# Patient Record
Sex: FEMALE | Race: WHITE
[De-identification: names, ages, dates, MRNs, and addresses within clinical notes are randomized per-mention and may not be internally consistent; named-entity substitution may affect disease eponyms.]

---

## 2017-11-06 ENCOUNTER — HOSPITAL ENCOUNTER (OUTPATIENT)
Dept: HOSPITAL 47 - RADMAMWWP | Age: 72
Discharge: HOME | End: 2017-11-06
Payer: MEDICARE

## 2017-11-06 DIAGNOSIS — Z12.31: Primary | ICD-10-CM

## 2017-11-06 PROCEDURE — 77063 BREAST TOMOSYNTHESIS BI: CPT

## 2017-11-08 NOTE — MM
Reason for exam: screening  (asymptomatic).

Last mammogram was performed 1 year and 1 month ago.



History:

Patient is postmenopausal and is nulliparous.

Reductions of both breasts, 1989.  Reductions of both breasts, 1983.  Excisional 

biopsy of the left breast.



Physical Findings:

A clinical breast exam by your physician is recommended on an annual basis and 

results should be correlated with mammographic findings.



MG 3D Screening Mammo W/Cad

Bilateral CC and MLO view(s) were taken.

Prior study comparison: October 7, 2016, bilateral MG 3d screening mammo w/cad.  

September 21, 2015, bilateral MG screening mammo w CAD.

The breast tissue is heterogeneously dense. This may lower the sensitivity of 

mammography.

Finding: There are typically benign fine diffuse/scattered calcifications in both 

breasts.

No significant changes in finding since October 7, 2016 and September 21, 2015.





ASSESSMENT: Benign, BI-RAD 2



RECOMMENDATION:

Routine screening mammogram of both breasts in 1 year.

## 2019-01-24 ENCOUNTER — HOSPITAL ENCOUNTER (OUTPATIENT)
Dept: HOSPITAL 47 - RADMAMWWP | Age: 74
Discharge: HOME | End: 2019-01-24
Attending: INTERNAL MEDICINE
Payer: MEDICARE

## 2019-01-24 DIAGNOSIS — Z12.31: Primary | ICD-10-CM

## 2019-01-24 PROCEDURE — 77063 BREAST TOMOSYNTHESIS BI: CPT

## 2019-01-24 PROCEDURE — 77067 SCR MAMMO BI INCL CAD: CPT

## 2019-01-27 NOTE — MM
Reason for exam: screening  (asymptomatic).

Last mammogram was performed 1 year and 3 months ago.



History:

Patient is postmenopausal and is nulliparous.

Reductions of both breasts, 1989.  Reductions of both breasts, 1983.  Excisional 

biopsy of the left breast.



MG 3D Screening Mammo W/Cad

Bilateral CC, MLO, and XCCL view(s) were taken.

Prior study comparison: November 6, 2017, bilateral MG 3d screening mammo w/cad.  

October 7, 2016, bilateral MG 3d screening mammo w/cad.

The breast tissue is heterogeneously dense. This may lower the sensitivity of 

mammography.

No discrete abnormality.  No significant changes when compared with prior studies.





ASSESSMENT: Benign, BI-RAD 2



RECOMMENDATION:

Routine screening mammogram of both breasts in 1 year.

## 2019-11-25 ENCOUNTER — HOSPITAL ENCOUNTER (OUTPATIENT)
Dept: HOSPITAL 47 - OR | Age: 74
Discharge: HOME | End: 2019-11-25
Attending: OPHTHALMOLOGY
Payer: MEDICARE

## 2019-11-25 VITALS — SYSTOLIC BLOOD PRESSURE: 137 MMHG | DIASTOLIC BLOOD PRESSURE: 62 MMHG | HEART RATE: 69 BPM

## 2019-11-25 VITALS — TEMPERATURE: 98 F

## 2019-11-25 VITALS — RESPIRATION RATE: 17 BRPM

## 2019-11-25 VITALS — BODY MASS INDEX: 27.3 KG/M2

## 2019-11-25 DIAGNOSIS — H00.026: ICD-10-CM

## 2019-11-25 DIAGNOSIS — Z90.89: ICD-10-CM

## 2019-11-25 DIAGNOSIS — H52.4: ICD-10-CM

## 2019-11-25 DIAGNOSIS — Z90.710: ICD-10-CM

## 2019-11-25 DIAGNOSIS — H54.62: ICD-10-CM

## 2019-11-25 DIAGNOSIS — H00.023: ICD-10-CM

## 2019-11-25 DIAGNOSIS — H25.813: Primary | ICD-10-CM

## 2019-11-25 DIAGNOSIS — Z97.3: ICD-10-CM

## 2019-11-25 DIAGNOSIS — Z79.899: ICD-10-CM

## 2019-11-25 DIAGNOSIS — Z88.0: ICD-10-CM

## 2019-11-25 DIAGNOSIS — J30.2: ICD-10-CM

## 2019-11-25 DIAGNOSIS — Z88.5: ICD-10-CM

## 2019-11-25 DIAGNOSIS — Z88.6: ICD-10-CM

## 2019-11-25 DIAGNOSIS — E78.2: ICD-10-CM

## 2019-11-25 DIAGNOSIS — H04.123: ICD-10-CM

## 2019-11-25 DIAGNOSIS — I10: ICD-10-CM

## 2019-11-25 DIAGNOSIS — Z98.890: ICD-10-CM

## 2019-11-25 DIAGNOSIS — M19.90: ICD-10-CM

## 2019-11-25 DIAGNOSIS — Z83.3: ICD-10-CM

## 2019-11-25 DIAGNOSIS — H53.032: ICD-10-CM

## 2019-11-25 PROCEDURE — 66984 XCAPSL CTRC RMVL W/O ECP: CPT

## 2019-11-25 RX ADMIN — CYCLOPENTOLATE HYDROCHLORIDE ONE DROPS: 10 SOLUTION/ DROPS OPHTHALMIC at 13:19

## 2019-11-25 RX ADMIN — CYCLOPENTOLATE HYDROCHLORIDE ONE DROPS: 10 SOLUTION/ DROPS OPHTHALMIC at 13:10

## 2019-11-25 RX ADMIN — PHENYLEPHRINE HYDROCHLORIDE NR DROPS: 25 SOLUTION/ DROPS OPHTHALMIC at 13:24

## 2019-11-25 RX ADMIN — PHENYLEPHRINE HYDROCHLORIDE NR DROPS: 25 SOLUTION/ DROPS OPHTHALMIC at 13:16

## 2019-11-25 RX ADMIN — CYCLOPENTOLATE HYDROCHLORIDE ONE DROPS: 10 SOLUTION/ DROPS OPHTHALMIC at 13:27

## 2019-11-25 RX ADMIN — PHENYLEPHRINE HYDROCHLORIDE NR DROPS: 25 SOLUTION/ DROPS OPHTHALMIC at 13:30

## 2020-02-13 ENCOUNTER — HOSPITAL ENCOUNTER (OUTPATIENT)
Dept: HOSPITAL 47 - RADMAMWWP | Age: 75
End: 2020-02-13
Attending: INTERNAL MEDICINE
Payer: MEDICARE

## 2020-02-13 DIAGNOSIS — Z12.31: Primary | ICD-10-CM

## 2020-02-13 PROCEDURE — 77063 BREAST TOMOSYNTHESIS BI: CPT

## 2020-02-13 PROCEDURE — 77067 SCR MAMMO BI INCL CAD: CPT

## 2020-02-14 NOTE — MM
Reason for exam: screening  (asymptomatic).

Last mammogram was performed 1 year and 1 month ago.



History:

Patient is postmenopausal and is nulliparous.

Reductions of both breasts, 1989.  Reductions of both breasts, 1983.  Excisional 

biopsy of the left breast.



Physical Findings:

A clinical breast exam by your physician is recommended on an annual basis and 

results should be correlated with mammographic findings.



MG 3D Screening Mammo W/Cad

Bilateral CC and MLO view(s) were taken.  XCCM view(s) were taken of the right 

breast.  XCCL view(s) were taken of the left breast.

Prior study comparison: January 24, 2019, bilateral MG 3d screening mammo w/cad.  

November 6, 2017, bilateral MG 3d screening mammo w/cad.

The breast tissue is heterogeneously dense. This may lower the sensitivity of 

mammography.  Benign appearing bilateral calcifications. Post surgical change 

bilaterally. Left lower inner quadrant architectural distortion appears more 

prominent. Ultrasound recommended to ensure a vascular scar.





ASSESSMENT: Incomplete: need additional imaging evaluation, BI-RAD 0



RECOMMENDATION:

Ultrasound of the left breast.

(lower inner quadrant)



[facility] will attempt to contact patient  to return for ultrasound.

## 2020-02-27 ENCOUNTER — HOSPITAL ENCOUNTER (OUTPATIENT)
Dept: HOSPITAL 47 - RADUSWWP | Age: 75
Discharge: HOME | End: 2020-02-27
Attending: INTERNAL MEDICINE
Payer: MEDICARE

## 2020-02-27 DIAGNOSIS — R92.8: Primary | ICD-10-CM

## 2020-02-28 NOTE — USB
Reason for exam: additional evaluation requested from abnormal screening.



History:

Patient is postmenopausal and is nulliparous.

Reductions of both breasts, 1989.  Reductions of both breasts, 1983.  Excisional 

biopsy of the left breast.



Physical Findings:

Nurse did not find any significant physical abnormalities on exam.



US Breast Workup Limited LT

Left limited breast ultrasound including focal area of concern, retroareolar and 

axilla demonstrates a 4 x 2 x 2mm hypoechoic lesion at 8 o'clock.



These results were verbally communicated with the patient and result sheet given 

to the patient on 2/27/20.





ASSESSMENT: Suspicious, BI-RAD 4



RECOMMENDATION:

Ultrasound core biopsy of the left breast.

(correlate with mammographic finding after clip placement)



Called Dr. Flores's office with mammographic findings and has scheduled an 

appointment for the patient for 4/11/20 at 3:00 with Dr. Dang.

Biopsy scheduled for 3/25/20.



PRELIMINARY REPORT CALLED AND FAXED TO DR. DANG ON 2/28/20.

## 2021-02-03 NOTE — OP
OPERATIVE REPORT



DATE OF SURGERY:

11/25/2019



SURGEON:

Dr. Giuseppe Peacock



PREOPERATIVE DIAGNOSES:

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



POSTOPERATIVE DIAGNOSES:

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



OPERATION:

Phacoemulsification of cataract and intraocular lens implant of the right eye.



ESTIMATED BLOOD LOSS:

Zero.



SPECIMEN TAKEN:

None.



NARRATIVE:

After obtaining the appropriate consent, the patient was brought to the Operating Room

where the patient was placed under cardiac monitoring and prepped and draped in the

usual sterile manner.  At the 11 o'clock position a 15 degree super sharp blade was

used to create a paracentesis followed by instillation of 1% Xylocaine MPF 50:50 mix

with BSS into the anterior chamber.  This was followed by Amvisc to stabilize the

anterior chamber.  At the  9 o'clock position a self-sealing corneal flap incision was

created using 2.8 mm sarah keratome.  A cystotome was used to initiate a continuous

tear capsulorrhexis which was completed with the Utrata forceps.  A Binkhorst cannula

was used to hydrodissect the lens nucleus followed by hydrodelineation.

Phacoemulsification of the lens was performed utilizing phaco chop in 13.68 seconds at

13% power.  The remaining cortical material was removed using the irrigation aspiration

mode followed by additional 1% Xylocaine MPF into the anterior chamber followed by

viscoelastic to stabilize the capsular bag.  An ALEXSANDER PCB00, 20.5 diopters posterior

chamber lens was placed into the capsular bag without difficulty.  The remaining

viscoelastic material was removed from the anterior chamber with the

irrigation/aspiration.  Balanced salt solution was used to normalize the intraocular

pressure.  The incision was checked for watertight integrity.  The patient then

received two drops of 0.5% timolol followed by two drops Vigamox, was lightly patched

and shielded in the usual manner.  There were no complications from the procedure.  The

patient tolerated the procedure well and was returned to recovery in good condition.





MMODL / IJN: [id number] / Job#: [id number] Const: AOX3 nontoxic appearing, no apparent physical distress. Stable gait   HEENT: NC/AT. Moist mucous membranes.  Neck: Soft and supple. Full ROM without pain.  Resp: Speaking in full sentences. No evidence of respiratory distress  Skin: No rashes, abrasions or lacerations.  Neuro: Awake, alert & oriented x 3. Moves all extremities symmetrically.

## 2023-07-30 ENCOUNTER — HOSPITAL ENCOUNTER (EMERGENCY)
Dept: HOSPITAL 47 - EC | Age: 78
Discharge: HOME | End: 2023-07-30
Payer: MEDICARE

## 2023-07-30 VITALS — DIASTOLIC BLOOD PRESSURE: 68 MMHG | SYSTOLIC BLOOD PRESSURE: 161 MMHG | RESPIRATION RATE: 18 BRPM | HEART RATE: 86 BPM

## 2023-07-30 VITALS — TEMPERATURE: 98 F

## 2023-07-30 DIAGNOSIS — E86.0: ICD-10-CM

## 2023-07-30 DIAGNOSIS — Z88.0: ICD-10-CM

## 2023-07-30 DIAGNOSIS — R53.1: Primary | ICD-10-CM

## 2023-07-30 DIAGNOSIS — Z88.6: ICD-10-CM

## 2023-07-30 DIAGNOSIS — Z88.8: ICD-10-CM

## 2023-07-30 DIAGNOSIS — I10: ICD-10-CM

## 2023-07-30 DIAGNOSIS — Z79.899: ICD-10-CM

## 2023-07-30 LAB
ALBUMIN SERPL-MCNC: 3.9 G/DL (ref 3.5–5)
ALP SERPL-CCNC: 122 U/L (ref 38–126)
ALT SERPL-CCNC: 22 U/L (ref 4–34)
ANION GAP SERPL CALC-SCNC: 6 MMOL/L
AST SERPL-CCNC: 26 U/L (ref 14–36)
BASOPHILS # BLD AUTO: 0 K/UL (ref 0–0.2)
BASOPHILS NFR BLD AUTO: 0 %
BUN SERPL-SCNC: 12 MG/DL (ref 7–17)
CALCIUM SPEC-MCNC: 8.8 MG/DL (ref 8.4–10.2)
CHLORIDE SERPL-SCNC: 98 MMOL/L (ref 98–107)
CO2 SERPL-SCNC: 25 MMOL/L (ref 22–30)
EOSINOPHIL # BLD AUTO: 0.1 K/UL (ref 0–0.7)
EOSINOPHIL NFR BLD AUTO: 1 %
ERYTHROCYTE [DISTWIDTH] IN BLOOD BY AUTOMATED COUNT: 4.64 M/UL (ref 3.8–5.4)
ERYTHROCYTE [DISTWIDTH] IN BLOOD: 12.8 % (ref 11.5–15.5)
GLUCOSE SERPL-MCNC: 134 MG/DL (ref 74–99)
HCT VFR BLD AUTO: 41.8 % (ref 34–46)
HGB BLD-MCNC: 14.3 GM/DL (ref 11.4–16)
LYMPHOCYTES # SPEC AUTO: 1.9 K/UL (ref 1–4.8)
LYMPHOCYTES NFR SPEC AUTO: 29 %
MAGNESIUM SPEC-SCNC: 1.9 MG/DL (ref 1.6–2.3)
MCH RBC QN AUTO: 30.7 PG (ref 25–35)
MCHC RBC AUTO-ENTMCNC: 34.1 G/DL (ref 31–37)
MCV RBC AUTO: 90.1 FL (ref 80–100)
MONOCYTES # BLD AUTO: 0.4 K/UL (ref 0–1)
MONOCYTES NFR BLD AUTO: 6 %
NEUTROPHILS # BLD AUTO: 4.1 K/UL (ref 1.3–7.7)
NEUTROPHILS NFR BLD AUTO: 63 %
NT-PROBNP SERPL-MCNC: 600 PG/ML
PH UR: 5.5 [PH] (ref 5–8)
PLATELET # BLD AUTO: 185 K/UL (ref 150–450)
POTASSIUM SERPL-SCNC: 4.1 MMOL/L (ref 3.5–5.1)
PROT SERPL-MCNC: 6.7 G/DL (ref 6.3–8.2)
SODIUM SERPL-SCNC: 129 MMOL/L (ref 137–145)
SP GR UR: 1.01 (ref 1–1.03)
SQUAMOUS UR QL AUTO: 2 /HPF (ref 0–4)
UROBILINOGEN UR QL STRIP: <2 MG/DL (ref ?–2)
WBC # BLD AUTO: 6.5 K/UL (ref 3.8–10.6)
WBC #/AREA URNS HPF: 5 /HPF (ref 0–5)

## 2023-07-30 PROCEDURE — 71046 X-RAY EXAM CHEST 2 VIEWS: CPT

## 2023-07-30 PROCEDURE — 81001 URINALYSIS AUTO W/SCOPE: CPT

## 2023-07-30 PROCEDURE — 85025 COMPLETE CBC W/AUTO DIFF WBC: CPT

## 2023-07-30 PROCEDURE — 36415 COLL VENOUS BLD VENIPUNCTURE: CPT

## 2023-07-30 PROCEDURE — 84484 ASSAY OF TROPONIN QUANT: CPT

## 2023-07-30 PROCEDURE — 83735 ASSAY OF MAGNESIUM: CPT

## 2023-07-30 PROCEDURE — 99285 EMERGENCY DEPT VISIT HI MDM: CPT

## 2023-07-30 PROCEDURE — 83880 ASSAY OF NATRIURETIC PEPTIDE: CPT

## 2023-07-30 PROCEDURE — 80053 COMPREHEN METABOLIC PANEL: CPT

## 2023-07-30 PROCEDURE — 83605 ASSAY OF LACTIC ACID: CPT

## 2023-07-30 PROCEDURE — 93005 ELECTROCARDIOGRAM TRACING: CPT

## 2023-07-30 PROCEDURE — 96360 HYDRATION IV INFUSION INIT: CPT

## 2023-07-30 NOTE — XR
EXAMINATION TYPE: XR chest 2V

 

DATE OF EXAM: 7/30/2023 8:50 AM

 

COMPARISON: Chest radiographs from 2/21/2015

 

TECHNIQUE: XR chest 2V Frontal and lateral views of the chest.

 

CLINICAL INDICATION:Female, 77 years old with history of Weakness; 

 

FINDINGS: 

Lungs/Pleura: There is no evidence of pleural effusion, focal consolidation, or pneumothorax.  

Pulmonary vascularity: Unremarkable.

Heart/mediastinum: Cardiomediastinal silhouette is unremarkable.

Musculoskeletal: No acute osseous pathology.

 

IMPRESSION: 

No acute cardiopulmonary disease/process.

## 2023-08-10 ENCOUNTER — HOSPITAL ENCOUNTER (INPATIENT)
Dept: HOSPITAL 47 - EC | Age: 78
LOS: 5 days | Discharge: NURSING, REHAB OR LONG-TERM CARE | DRG: 640 | End: 2023-08-15
Attending: INTERNAL MEDICINE | Admitting: INTERNAL MEDICINE
Payer: MEDICARE

## 2023-08-10 DIAGNOSIS — Z90.710: ICD-10-CM

## 2023-08-10 DIAGNOSIS — E86.1: ICD-10-CM

## 2023-08-10 DIAGNOSIS — I80.8: ICD-10-CM

## 2023-08-10 DIAGNOSIS — L73.9: ICD-10-CM

## 2023-08-10 DIAGNOSIS — K21.9: ICD-10-CM

## 2023-08-10 DIAGNOSIS — Z98.41: ICD-10-CM

## 2023-08-10 DIAGNOSIS — E11.65: ICD-10-CM

## 2023-08-10 DIAGNOSIS — E78.5: ICD-10-CM

## 2023-08-10 DIAGNOSIS — E87.1: Primary | ICD-10-CM

## 2023-08-10 DIAGNOSIS — Z86.72: ICD-10-CM

## 2023-08-10 DIAGNOSIS — E86.0: ICD-10-CM

## 2023-08-10 DIAGNOSIS — Z88.6: ICD-10-CM

## 2023-08-10 DIAGNOSIS — N18.31: ICD-10-CM

## 2023-08-10 DIAGNOSIS — Z88.0: ICD-10-CM

## 2023-08-10 DIAGNOSIS — N17.0: ICD-10-CM

## 2023-08-10 DIAGNOSIS — K56.609: ICD-10-CM

## 2023-08-10 DIAGNOSIS — K52.9: ICD-10-CM

## 2023-08-10 DIAGNOSIS — L03.113: ICD-10-CM

## 2023-08-10 DIAGNOSIS — E11.22: ICD-10-CM

## 2023-08-10 DIAGNOSIS — Z79.899: ICD-10-CM

## 2023-08-10 DIAGNOSIS — I12.9: ICD-10-CM

## 2023-08-10 DIAGNOSIS — R11.2: ICD-10-CM

## 2023-08-10 LAB
ALBUMIN SERPL-MCNC: 4.1 G/DL (ref 3.5–5)
ALP SERPL-CCNC: 136 U/L (ref 38–126)
ALT SERPL-CCNC: 26 U/L (ref 4–34)
ANION GAP SERPL CALC-SCNC: 10 MMOL/L
AST SERPL-CCNC: 26 U/L (ref 14–36)
BASOPHILS # BLD AUTO: 0 K/UL (ref 0–0.2)
BASOPHILS NFR BLD AUTO: 1 %
BUN SERPL-SCNC: 15 MG/DL (ref 7–17)
CALCIUM SPEC-MCNC: 9.1 MG/DL (ref 8.4–10.2)
CHLORIDE SERPL-SCNC: 94 MMOL/L (ref 98–107)
CO2 SERPL-SCNC: 22 MMOL/L (ref 22–30)
EOSINOPHIL # BLD AUTO: 0.1 K/UL (ref 0–0.7)
EOSINOPHIL NFR BLD AUTO: 1 %
ERYTHROCYTE [DISTWIDTH] IN BLOOD BY AUTOMATED COUNT: 4.75 M/UL (ref 3.8–5.4)
ERYTHROCYTE [DISTWIDTH] IN BLOOD: 12.7 % (ref 11.5–15.5)
GLUCOSE SERPL-MCNC: 155 MG/DL (ref 74–99)
HCT VFR BLD AUTO: 42 % (ref 34–46)
HGB BLD-MCNC: 14.6 GM/DL (ref 11.4–16)
LYMPHOCYTES # SPEC AUTO: 2 K/UL (ref 1–4.8)
LYMPHOCYTES NFR SPEC AUTO: 33 %
MAGNESIUM SPEC-SCNC: 1.8 MG/DL (ref 1.6–2.3)
MCH RBC QN AUTO: 30.7 PG (ref 25–35)
MCHC RBC AUTO-ENTMCNC: 34.7 G/DL (ref 31–37)
MCV RBC AUTO: 88.5 FL (ref 80–100)
MONOCYTES # BLD AUTO: 0.4 K/UL (ref 0–1)
MONOCYTES NFR BLD AUTO: 6 %
NEUTROPHILS # BLD AUTO: 3.6 K/UL (ref 1.3–7.7)
NEUTROPHILS NFR BLD AUTO: 59 %
PH UR: 6 [PH] (ref 5–8)
PLATELET # BLD AUTO: 180 K/UL (ref 150–450)
POTASSIUM SERPL-SCNC: 4.2 MMOL/L (ref 3.5–5.1)
PROT SERPL-MCNC: 6.7 G/DL (ref 6.3–8.2)
SODIUM SERPL-SCNC: 126 MMOL/L (ref 137–145)
SP GR UR: 1 (ref 1–1.03)
UROBILINOGEN UR QL STRIP: <2 MG/DL (ref ?–2)
WBC # BLD AUTO: 6.2 K/UL (ref 3.8–10.6)

## 2023-08-10 PROCEDURE — 80053 COMPREHEN METABOLIC PANEL: CPT

## 2023-08-10 PROCEDURE — 85025 COMPLETE CBC W/AUTO DIFF WBC: CPT

## 2023-08-10 PROCEDURE — 84443 ASSAY THYROID STIM HORMONE: CPT

## 2023-08-10 PROCEDURE — 96374 THER/PROPH/DIAG INJ IV PUSH: CPT

## 2023-08-10 PROCEDURE — 84300 ASSAY OF URINE SODIUM: CPT

## 2023-08-10 PROCEDURE — 81003 URINALYSIS AUTO W/O SCOPE: CPT

## 2023-08-10 PROCEDURE — 83935 ASSAY OF URINE OSMOLALITY: CPT

## 2023-08-10 PROCEDURE — 80048 BASIC METABOLIC PNL TOTAL CA: CPT

## 2023-08-10 PROCEDURE — 99285 EMERGENCY DEPT VISIT HI MDM: CPT

## 2023-08-10 PROCEDURE — 36415 COLL VENOUS BLD VENIPUNCTURE: CPT

## 2023-08-10 PROCEDURE — 83735 ASSAY OF MAGNESIUM: CPT

## 2023-08-10 PROCEDURE — 96361 HYDRATE IV INFUSION ADD-ON: CPT

## 2023-08-10 PROCEDURE — 71045 X-RAY EXAM CHEST 1 VIEW: CPT

## 2023-08-10 PROCEDURE — 83036 HEMOGLOBIN GLYCOSYLATED A1C: CPT

## 2023-08-10 RX ADMIN — ATORVASTATIN CALCIUM SCH MG: 10 TABLET, FILM COATED ORAL at 20:55

## 2023-08-10 RX ADMIN — METOPROLOL TARTRATE SCH MG: 25 TABLET, FILM COATED ORAL at 20:55

## 2023-08-10 NOTE — HP
HISTORY AND PHYSICAL



CHIEF COMPLAINT:

Weakness.



HISTORY OF PRESENT ILLNESS:

This is a 77-year-old woman with a past medical history of multiple medical issues

including hypertension, hyperlipidemia, was admitted with some weakness and brain fog.

The patient was apparently drinking a little too much water according to her, but the

patient also had bedbug infestation and heat was supplied into the room according to

her, and currently, the patient is probably dehydrated and admitted for further

evaluation and treatment.  There is no history of any fever, rigors, or chills at this

time.



PAST MEDICAL HISTORY:

Reviewed include hypertension, hyperlipidemia. Rest of the history and rest of the

chart is also reviewed.



HOME MEDICATIONS:

Zestril. Dose and rest of medications reviewed.



ALLERGIES:

Reviewed include Motrin, rest of allergies noted.



FAMILY HISTORY:

Diabetes mellitus in the family.



SOCIAL HISTORY:

No history of smoking.  Occasional alcohol.



REVIEW OF SYSTEMS:

Fourteen-point review is negative except as mentioned earlier.



PHYSICAL EXAMINATION:

VITAL SIGNS: Pulse is 82, blood pressure 113/88, respirations 18.

HEENT: Conjunctivae normal. Oral mucosa dry.

NECK: No JVD.

CARDIOVASCULAR: S1, S2 normal.

RESPIRATIONS:  Clear to auscultation.

ABDOMEN:  Soft.

NERVOUS SYSTEM: Nonfocal.

SKIN:  No ulcer, rash, or bleeding.

JOINTS:  No active deforming arthropathy.



LABORATORY DATA:

Sodium 120.



ASSESSMENT:

1. Hyponatremia, possibly hypovolemic.

2. Hypertension.

3. Hyperlipidemia.

4. Multiple medical issues.



RECOMMENDATIONS AND DISCUSSION:

This is a 77-year-old woman, who presented with multiple complex medical issues, we

will continue with IV fluids and hydration. Otherwise, resume the home medications and

Nephrology consultation.  Repeat labs in the morning.  to evaluate the

home situation. Otherwise, prognosis guarded. Further recommendations to follow.  See

orders for further details.





MMODL / IJN: [id number] / Job#: [id number]

## 2023-08-10 NOTE — ED
General Adult HPI





- General


Chief complaint: Weakness


Stated complaint: Dehydration, Head Fog


Time Seen by Provider: 08/10/23 06:14


Source: patient, RN notes reviewed


Mode of arrival: wheelchair


Limitations: no limitations





- History of Present Illness


Initial comments: 


77-year-old female presents emergency Department with chief complaint of feeling

thirsty, episode of nausea and vomiting.  Patient states that she vomited 

Tuesday she states that she just felt off yesterday felt thirsty again today.  

She was seen here week ago for similar complaints.  She states that she feels 

that she can't drink enough water.  She feels that she may be dehydrated.  She 

denies any chest pain shortness of breath.  She did have an episode where she 

felt lightheaded today but has resolved now with chest pain or shortness breath.

 Denies any dysuria hematuria.








- Related Data


                                Home Medications











 Medication  Instructions  Recorded  Confirmed


 


amLODIPine/ATORVASTATIN [Caduet 10 1 tab PO HS 02/21/15 03/10/20





mg-10 mg Tablet]   


 


Calcium Carbonate/Vitamin D3 1 each PO DAILY 02/22/15 03/10/20





[Calcium 600-Vit D3 400 Tablet]   


 


Metoprolol Tartrate 25 mg PO BID 03/10/20 03/10/20


 


lisinopriL [Zestril] 5 mg PO DAILY 03/10/20 03/10/20











                                    Allergies











Allergy/AdvReac Type Severity Reaction Status Date / Time


 


acetaminophen [From Tylenol] AdvReac  Vomiting Verified 03/10/20 11:54


 


ibuprofen [From Motrin] AdvReac  Vomiting Verified 03/10/20 11:54


 


Penicillins AdvReac  Rapid Verified 03/10/20 11:54





   Heart Rate  


 


propoxyphene HCl AdvReac  Rapid Verified 03/10/20 11:54





[From Darvon]   Heart Rate  














Review of Systems


ROS Statement: 


Those systems with pertinent positive or pertinent negative responses have been 

documented in the HPI.





ROS Other: All systems not noted in ROS Statement are negative.





Past Medical History


Past Medical History: Hyperlipidemia, Hypertension


History of Any Multi-Drug Resistant Organisms: None Reported


Past Surgical History: Breast Surgery, Hysterectomy, Tonsillectomy


Additional Past Surgical History / Comment(s): 2 clamps in left eye, chin lift, 

upper and lower eyelids bilateral taken off, breast reduction, right cataract 

removed


Past Anesthesia/Blood Transfusion Reactions: No Reported Reaction


Past Psychological History: No Psychological Hx Reported


Smoking Status: Never smoker


Past Alcohol Use History: Occasional


Past Drug Use History: None Reported





- Past Family History


  ** Brother(s)


Family Medical History: Diabetes Mellitus





General Exam


Limitations: no limitations


General appearance: alert, in no apparent distress


Head exam: Present: atraumatic, normocephalic, normal inspection


Eye exam: Present: normal appearance, PERRL, EOMI.  Absent: scleral icterus, 

conjunctival injection, periorbital swelling


ENT exam: Present: normal exam, normal oropharynx, mucous membranes moist


Neck exam: Present: normal inspection, full ROM.  Absent: tenderness, 

meningismus, lymphadenopathy


Respiratory exam: Present: normal lung sounds bilaterally.  Absent: respiratory 

distress, wheezes, rales, rhonchi, stridor


Cardiovascular Exam: Present: regular rate, normal rhythm, normal heart sounds. 

Absent: systolic murmur, diastolic murmur, rubs, gallop, clicks


GI/Abdominal exam: Present: soft, normal bowel sounds.  Absent: distended, 

tenderness, guarding, rebound, rigid


Neurological exam: Present: alert


Skin exam: Present: warm, dry, intact, normal color.  Absent: rash





Course


                                   Vital Signs











  08/10/23 08/10/23





  06:09 07:50


 


Temperature 98 F 97.8 F


 


Pulse Rate 92 69


 


Respiratory 18 18





Rate  


 


Blood Pressure 117/71 144/81


 


O2 Sat by Pulse 98 95





Oximetry  














Medical Decision Making





- Medical Decision Making


Was pt. sent in by a medical professional or institution ([facility], NP, urgent 

care, hospital, or nursing home...) When possible be specific


@  -No


Did you speak to anyone other than the patient for history (EMS, parent, family,

police, friend...)? What history was obtained from this source 


@  -No


Did you review nursing and triage notes (agree or disagree)?  Why? 


@  -I reviewed and agree with nursing and triage notes


Were old charts reviewed (outside hosp., previous admission, EMS record, old 

EKG, old radiological studies, urgent care reports/EKG's, nursing home records)?

Report findings 


@  -Reviewed recent laboratory studies


Differential Diagnosis (chest pain, altered mental status, abdominal pain women,

abdominal pain men, vaginal bleeding, weakness, fever, dyspnea, syncope, 

headache, dizziness, GI bleed, back pain, seizure, CVA, palpatations, mental 

health, musculoskeletal)? 


@  -Differential Weakness:


Hypoglycemia, shock, sepsis, hyponatremia, anemia, infection, MI, ETOH, adverse 

medicine reaction, overdose, stroke, this is not meant to be an all-inclusive 

list.


EKG interpreted by me (3pts min.).


@  -[None 


X-rays interpreted by me (1pt min.).


@  -None done


CT interpreted by me (1pt min.).


@  -None done


U/S interpreted by me (1pt. min.).


@  -None done


What testing was considered but not performed or refused? (CT, X-rays, U/S, 

labs)? Why?


@  -None


What meds were considered but not given or refused? Why?


@  -None


Did you discuss the management of the patient with other professionals 

(professionals i.e. , PA, NP, lab, RT, psych nurse, , , 

teacher, , )? Give summary


@  -No


Was smoking cessation discussed for >3mins.?


@  -No


Was critical care preformed (if so, how long)?


@  -No


Were there social determinants of health that impacted care today? How? 

(Homelessness, low income, unemployed, alcoholism, drug addiction, 

transportation, low edu. Level, literacy, decrease access to med. care, detention, re

hab)?


@  -No


Was there de-escalation of care discussed even if they declined (Discuss DNR or 

withdrawal of care, Hospice)? DNR status


@  -No


What co-morbidities impacted this encounter? (DM, HTN, Smoking, COPD, CAD, 

Cancer, CVA, ARF, Chemo, Hep., AIDS, mental health diagnosis, sleep apnea, 

morbid obesity)?


@  -None


Was patient admitted / discharged? Hospital course, mention meds given and 

route, prescriptions, significant lab abnormalities, going to OR and other 

pertinent info.


@  -admitted patient is having worsening sodium levels with symptoms including 

weakness, increased thirst, dizziness.  Patiently admitted for hydration, 

recheck of sodium.  I do believe her hyponatremia may be related to water 

intoxication. 


Undiagnosed new problem with uncertain prognosis?


@  -No


Drug Therapy requiring intensive monitoring for toxicity (Heparin, Nitro, 

Insulin, Cardizem)?


@  -No


Were any procedures done?


@  -No


Diagnosis/symptom?


@  -Hyponatremia


Acute, or Chronic, or Acute on Chronic?


@  -Acute


Uncomplicated (without systemic symptoms) or Complicated (systemic symptoms)?


@  -[compicated


Side effects of treatment?


@  -No


Exacerbation, Progression, or Severe Exacerbation?


@  -No


Poses a threat to life or bodily function? How? (Chest pain, USA, MI, pneumonia,

 PE, COPD, DKA, ARF, appy, cholecystitis, CVA, Diverticulitis, Homicidal, 

Suicidal, threat to staff... and all critical care pts)


@  -No








- Lab Data


Result diagrams: 


                                 08/10/23 06:36





                                 08/10/23 06:36


                                   Lab Results











  08/10/23 08/10/23 08/10/23 Range/Units





  06:36 06:36 06:36 


 


WBC  6.2    (3.8-10.6)  k/uL


 


RBC  4.75    (3.80-5.40)  m/uL


 


Hgb  14.6    (11.4-16.0)  gm/dL


 


Hct  42.0    (34.0-46.0)  %


 


MCV  88.5    (80.0-100.0)  fL


 


MCH  30.7    (25.0-35.0)  pg


 


MCHC  34.7    (31.0-37.0)  g/dL


 


RDW  12.7    (11.5-15.5)  %


 


Plt Count  180    (150-450)  k/uL


 


MPV  7.3    


 


Neutrophils %  59    %


 


Lymphocytes %  33    %


 


Monocytes %  6    %


 


Eosinophils %  1    %


 


Basophils %  1    %


 


Neutrophils #  3.6    (1.3-7.7)  k/uL


 


Lymphocytes #  2.0    (1.0-4.8)  k/uL


 


Monocytes #  0.4    (0-1.0)  k/uL


 


Eosinophils #  0.1    (0-0.7)  k/uL


 


Basophils #  0.0    (0-0.2)  k/uL


 


Sodium    126 L  (137-145)  mmol/L


 


Potassium    4.2  (3.5-5.1)  mmol/L


 


Chloride    94 L  ()  mmol/L


 


Carbon Dioxide    22  (22-30)  mmol/L


 


Anion Gap    10  mmol/L


 


BUN    15  (7-17)  mg/dL


 


Creatinine    1.07 H  (0.52-1.04)  mg/dL


 


Est GFR (CKD-EPI)AfAm    58  (>60 ml/min/1.73 sqM)  


 


Est GFR (CKD-EPI)NonAf    51  (>60 ml/min/1.73 sqM)  


 


Glucose    155 H  (74-99)  mg/dL


 


Calcium    9.1  (8.4-10.2)  mg/dL


 


Magnesium    1.8  (1.6-2.3)  mg/dL


 


Total Bilirubin    1.0  (0.2-1.3)  mg/dL


 


AST    26  (14-36)  U/L


 


ALT    26  (4-34)  U/L


 


Alkaline Phosphatase    136 H  ()  U/L


 


Total Protein    6.7  (6.3-8.2)  g/dL


 


Albumin    4.1  (3.5-5.0)  g/dL


 


TSH    1.050  (0.465-4.680)  mIU/L


 


Urine Color   Colorless   


 


Urine Appearance   Clear   (Clear)  


 


Urine pH   6.0   (5.0-8.0)  


 


Ur Specific Gravity   1.003   (1.001-1.035)  


 


Urine Protein   Negative   (Negative)  


 


Urine Glucose (UA)   Negative   (Negative)  


 


Urine Ketones   Negative   (Negative)  


 


Urine Blood   Negative   (Negative)  


 


Urine Nitrite   Negative   (Negative)  


 


Urine Bilirubin   Negative   (Negative)  


 


Urine Urobilinogen   <2.0   (<2.0)  mg/dL


 


Ur Leukocyte Esterase   Negative   (Negative)  














Disposition


Clinical Impression: 


 Hyponatremia, Weakness, Lightheaded





Disposition: ADMITTED AS IP TO THIS HOSP


Condition: Fair


Referrals: 


Law Flores MD [Primary Care Provider] - 1-2 days


Time of Disposition: 08:51

## 2023-08-10 NOTE — P.NPCON
History of Present Illness





- Reason for Consult


hyponatremia





- History of Present Illness





Reason for consultation: Hyponatremia





History of present illness:


Patient is a 77-year-old female seen in renal consultation for hyponatremia.  

Patient came to the hospital due to not feeling well.  Patient states she's been

feeling foggy.  She denies dizziness or syncopal episodes.  Patient's sodium 

level on admission was 126.  She's currently receiving normal saline at 75 mL an

hour.  Patient states she was evaluated at this facility about 2 weeks ago due 

to dehydration.  She received IV fluids and was subsequently discharged.  

Patient states she drinks 4-5 24 ounce bottles of water daily.  Patient states s

he's been thirsty and has been drinking more.  She denies prior history of 

kidney disease.  Patient states Tuesday she had a good lunch and dinner but 

vomited in the evening.  She denies regular use of nonsteroidals.  Denies 

history of diabetes.  Denies history of coronary artery disease.  Denies family 

history of renal disease.  Denies personal history of malignancy.  Denies use of

any diuretics.  UA benign.  Creatinine 1.07 on admission.  She does have history

of hypertension and is maintained on metoprolol, lisinopril and amlodipine 

outpatient.  Blood pressures currently well controlled.  Denies fever or chills.

 No cough.





Vital signs are stable.


General: No acute distress.


HEENT: Head exam is unremarkable.


LUNGS: No audible rhonchi or wheezes.


HEART: Rate and Rhythm are regular.


ABDOMEN: Nontender.


EXTREMITITES: Trace edema.





Past Medical History


Past Medical History: Hyperlipidemia, Hypertension


History of Any Multi-Drug Resistant Organisms: None Reported


Past Surgical History: Breast Surgery, Hysterectomy, Tonsillectomy


Additional Past Surgical History / Comment(s): 2 clamps in left eye, chin lift, 

upper and lower eyelids bilateral taken off, breast reduction, right cataract 

removed


Past Anesthesia/Blood Transfusion Reactions: No Reported Reaction


Past Psychological History: No Psychological Hx Reported


Smoking Status: Never smoker


Past Alcohol Use History: Occasional


Past Drug Use History: None Reported





- Past Family History


  ** Brother(s)


Family Medical History: Diabetes Mellitus





Medications and Allergies


                                Home Medications











 Medication  Instructions  Recorded  Confirmed  Type


 


amLODIPine/ATORVASTATIN [Caduet 10 1 tab PO HS 02/21/15 08/10/23 History





mg-10 mg Tablet]    


 


Metoprolol Tartrate 25 mg PO BID 03/10/20 08/10/23 History


 


lisinopriL [Zestril] 5 mg PO DAILY 03/10/20 08/10/23 History


 


Calcium Carbonate [Calcium] 600 mg PO DAILY 08/10/23 08/10/23 History








                                    Allergies











Allergy/AdvReac Type Severity Reaction Status Date / Time


 


acetaminophen [From Tylenol] AdvReac  Vomiting Verified 08/10/23 12:28


 


ibuprofen [From Motrin] AdvReac  Vomiting Verified 08/10/23 12:28


 


Penicillins AdvReac  Rapid Verified 08/10/23 12:28





   Heart Rate  


 


propoxyphene HCl AdvReac  Rapid Verified 08/10/23 12:28





[From Darvon]   Heart Rate  














Physical Exam


Vitals: 


                                   Vital Signs











  Temp Pulse Resp BP Pulse Ox


 


 08/10/23 11:40  98.3 F  74  20  130/72  94 L


 


 08/10/23 10:10   82  18  139/88  95


 


 08/10/23 07:50  97.8 F  69  18  144/81  95


 


 08/10/23 06:09  98 F  92  18  117/71  98








                                Intake and Output











 08/09/23 08/10/23 08/10/23





 22:59 06:59 14:59


 


Output Total   120


 


Balance   -120


 


Output:   


 


  Urine   120


 


Other:   


 


  Voiding Method   Toilet


 


  Weight  90.718 kg 














Results





- Lab Results


                             Most recent lab results











Calcium  9.1 mg/dL (8.4-10.2)   08/10/23  06:36    


 


Magnesium  1.8 mg/dL (1.6-2.3)   08/10/23  06:36    














                                 08/10/23 06:36





                                 08/10/23 06:36





Assessment and Plan


Plan: 





Assessment:


1.  Hyponatremia from excessive fluid intake and component of hypovolemia from 

vomiting.  Sodium 126.  TSH normal.


2.  Increased thirst with glucose of 155.  Rule out diabetes.


3.  Benign hypertension.  Controlled.


4.  Chronic kidney disease stage IIIA with baseline creatinine 1-1.1 secondary 

to nephrosclerosis.





Plan:


Hep-Lock IV fluids.


1500 mL fluid restriction.


Check urine sodium level and urine osmolality.


Check hemoglobin A1c.


Follow-up chest x-ray.





Thank you for the consultation.  I will continue to follow the patient with you 

during her hospital stay.

## 2023-08-10 NOTE — XR
EXAMINATION TYPE: XR chest 1V portable

 

DATE OF EXAM: 8/10/2023

 

COMPARISON: 7/30/2023

 

INDICATION: CHF

 

TECHNIQUE: Single frontal view of the chest is obtained.

 

FINDINGS:  

The heart size is normal.  

The pulmonary vasculature is normal.  

The lungs are clear.  

 

 

IMPRESSION:  

1. No acute pulmonary process.

## 2023-08-11 LAB
ANION GAP SERPL CALC-SCNC: 11.8 MMOL/L (ref 4–12)
BASOPHILS # BLD AUTO: 0.05 X 10*3/UL (ref 0–0.1)
BASOPHILS NFR BLD AUTO: 0.7 %
BUN SERPL-SCNC: 12.8 MG/DL (ref 9–27)
BUN/CREAT SERPL: 11.64 RATIO (ref 12–20)
CALCIUM SPEC-MCNC: 9.3 MG/DL (ref 8.7–10.3)
CHLORIDE SERPL-SCNC: 99 MMOL/L (ref 96–109)
CO2 SERPL-SCNC: 22.2 MMOL/L (ref 21.6–31.8)
EOSINOPHIL # BLD AUTO: 0.05 X 10*3/UL (ref 0.04–0.35)
EOSINOPHIL NFR BLD AUTO: 0.7 %
ERYTHROCYTE [DISTWIDTH] IN BLOOD BY AUTOMATED COUNT: 4.28 X 10*6/UL (ref 4.1–5.2)
ERYTHROCYTE [DISTWIDTH] IN BLOOD: 13.1 % (ref 11.5–14.5)
GLUCOSE SERPL-MCNC: 134 MG/DL (ref 70–110)
HCT VFR BLD AUTO: 38.3 % (ref 37.2–46.3)
HGB BLD-MCNC: 13 D/DL (ref 12–15)
IMM GRANULOCYTES BLD QL AUTO: 0.3 %
LYMPHOCYTES # SPEC AUTO: 2.38 X 10*3/UL (ref 0.9–5)
LYMPHOCYTES NFR SPEC AUTO: 33.8 %
MAGNESIUM SPEC-SCNC: 2 MG/DL (ref 1.5–2.4)
MCH RBC QN AUTO: 30.4 PG (ref 27–32)
MCHC RBC AUTO-ENTMCNC: 33.9 D/DL (ref 32–37)
MCV RBC AUTO: 89.5 FL (ref 80–97)
MONOCYTES # BLD AUTO: 0.64 X 10*3/UL (ref 0.2–1)
MONOCYTES NFR BLD AUTO: 9.1 %
NEUTROPHILS # BLD AUTO: 3.91 X 10*3/UL (ref 1.8–7.7)
NEUTROPHILS NFR BLD AUTO: 55.4 %
NRBC BLD AUTO-RTO: 0 X 10*3/UL (ref 0–0.01)
PLATELET # BLD AUTO: 206 X 10*3/UL (ref 140–440)
POTASSIUM SERPL-SCNC: 4.5 MMOL/L (ref 3.5–5.5)
SODIUM SERPL-SCNC: 133 MMOL/L (ref 135–145)
WBC # BLD AUTO: 7.05 X 10*3/UL (ref 4.5–10)

## 2023-08-11 RX ADMIN — ATORVASTATIN CALCIUM SCH MG: 10 TABLET, FILM COATED ORAL at 20:39

## 2023-08-11 RX ADMIN — METOPROLOL TARTRATE SCH MG: 25 TABLET, FILM COATED ORAL at 10:29

## 2023-08-11 RX ADMIN — METOPROLOL TARTRATE SCH MG: 25 TABLET, FILM COATED ORAL at 20:38

## 2023-08-11 RX ADMIN — Medication SCH EACH: at 10:29

## 2023-08-11 NOTE — PN
PROGRESS NOTE



DATE OF SERVICE:  08/11/2023



SUBJECTIVE:

This is a 77-year-old woman, who was admitted with hyponatremia, possibly hypovolemic,

is also complaining of significant weakness.  No chest pain. No palpitation.



OBJECTIVE:

VITAL SIGNS: Pulse is 99, blood pressure 116/70, respirations 20.

CHEST:  Clear to auscultation.

CARDIOVASCULAR: S1, S2.

ABDOMEN:  Soft.

NERVOUS SYSTEM: Nonfocal.



LABORATORY DATA:

Awaited.



ASSESSMENT:

1. Hyponatremia, possibly hypovolemic.

2. Mild acute renal failure present on admission secondary to dehydration and acute

    tubular necrosis.

3. Hypertension.

4. Hyperlipidemia.

5. Multiple medical issues.

6. Gait dysfunction, multifactorial.



RECOMMENDATIONS AND DISCUSSION:

I recommend to continue current management, continue symptomatic treatment. Otherwise,

continue with IV fluids.  Recommend continue with checking the sodium.  Repeat labs.

Otherwise, PT, OT evaluation.  Nephrology evaluation, possible ECF rehab.  Prognosis

guarded.  Further recommendations to follow. See orders for further details. Social

worker to evaluate the home situation.





MMODL / IJN: [id number] / Job#: [id number]

## 2023-08-11 NOTE — P.PN
Subjective





Patient is seen in follow-up for hyponatremia.  Sodium level 126 yesterday.  

Currently off IV fluids.  Oral intake has been fair.  No vomiting or diarrhea.  

Hemodynamically stable.





Vital signs are stable.


General: No acute distress.


HEENT: Head exam is unremarkable.


LUNGS: No audible rhonchi or wheezes.


HEART: Rate and Rhythm are regular. 


ABDOMEN: Nontender.


EXTREMITITES: No edema.





Objective





- Vital Signs


Vital signs: 


                                   Vital Signs











Temp  98.3 F   08/11/23 07:00


 


Pulse  99   08/11/23 07:00


 


Resp  20   08/11/23 07:00


 


BP  116/72   08/11/23 07:00


 


Pulse Ox  97   08/11/23 07:00


 


FiO2      








                                 Intake & Output











 08/10/23 08/11/23 08/11/23





 18:59 06:59 18:59


 


Intake Total 360  120


 


Output Total 420  


 


Balance -60  120


 


Weight 90.718 kg  


 


Intake:   


 


  Oral 360  120


 


Output:   


 


  Urine 420  


 


Other:   


 


  Voiding Method Toilet Toilet Toilet


 


  # Voids 1 1 1














- Labs


CBC & Chem 7: 


                                 08/10/23 06:36





                                 08/10/23 06:36


Labs: 


                  Abnormal Lab Results - Last 24 Hours (Table)











  08/10/23 08/10/23 Range/Units





  06:36 16:00 


 


Hemoglobin A1c  6.3 H   (<=6.0)  %


 


Ur Random Sodium   39 L  ()  mmol/L














Assessment and Plan


Plan: 





Assessment:


1.  Hyponatremia from excessive fluid intake and component of hypovolemia from 

vomiting.  Sodium 126.  TSH normal.  Urine sodium less than 39 and urine 

osmolality 176.


2.  Increased thirst with glucose of 155.  Rule out diabetes.


3.  Benign hypertension.  Controlled.


4.  Chronic kidney disease stage IIIA with baseline creatinine 1-1.1 secondary 

to nephrosclerosis.





Plan:


1500 mL fluid restriction.


Follow-up hemoglobin A1c.


Morning labs pending.


Encouraged oral intake.

## 2023-08-12 LAB
ANION GAP SERPL CALC-SCNC: 8 MMOL/L
BUN SERPL-SCNC: 15 MG/DL (ref 7–17)
CALCIUM SPEC-MCNC: 8.6 MG/DL (ref 8.4–10.2)
CHLORIDE SERPL-SCNC: 98 MMOL/L (ref 98–107)
CO2 SERPL-SCNC: 24 MMOL/L (ref 22–30)
GLUCOSE SERPL-MCNC: 143 MG/DL (ref 74–99)
MAGNESIUM SPEC-SCNC: 2.1 MG/DL (ref 1.6–2.3)
POTASSIUM SERPL-SCNC: 4.5 MMOL/L (ref 3.5–5.1)
SODIUM SERPL-SCNC: 130 MMOL/L (ref 137–145)

## 2023-08-12 RX ADMIN — TEMAZEPAM PRN MG: 15 CAPSULE ORAL at 23:27

## 2023-08-12 RX ADMIN — METOPROLOL TARTRATE SCH MG: 25 TABLET, FILM COATED ORAL at 20:26

## 2023-08-12 RX ADMIN — ATORVASTATIN CALCIUM SCH MG: 10 TABLET, FILM COATED ORAL at 20:26

## 2023-08-12 RX ADMIN — METOPROLOL TARTRATE SCH MG: 25 TABLET, FILM COATED ORAL at 08:08

## 2023-08-12 RX ADMIN — Medication SCH EACH: at 08:08

## 2023-08-12 NOTE — P.PN
Subjective





Patient is seen for follow-up for hyponatremia.


No significant complaints today.


Patient reports good oral intake.


Serum sodium at 130 today





Not on any IV fluids or diuretics.








Objective





- Vital Signs


Vital signs: 


                                   Vital Signs











Temp  97.3 F L  08/12/23 07:37


 


Pulse  80   08/12/23 08:00


 


Resp  16   08/12/23 08:00


 


BP  122/75   08/12/23 07:37


 


Pulse Ox  96   08/12/23 07:37


 


FiO2      








                                 Intake & Output











 08/11/23 08/12/23 08/12/23





 18:59 06:59 18:59


 


Intake Total 480  118


 


Balance 480  118


 


Intake:   


 


  Oral 480  118


 


Other:   


 


  Voiding Method Toilet Toilet Toilet


 


  # Voids 2 2 














- Exam





Patient is awake, comfortable, no acute distress


Alert oriented 3


Examination of the heart S1 and S2


Examination of the lungs bilateral breath sounds are heard


Abdomen is soft nontender


Examination of lower extremities shows no significant edema


CNS exam grossly intact





- Labs


CBC & Chem 7: 


                                 08/11/23 06:29





                                 08/12/23 07:54


Labs: 


                  Abnormal Lab Results - Last 24 Hours (Table)











  08/12/23 Range/Units





  07:54 


 


Sodium  130 L  (137-145)  mmol/L


 


Glucose  143 H  (74-99)  mg/dL














Assessment and Plan


Assessment: 





1.  Hyponatremia from excessive fluid intake and component of hypovolemia from 

vomiting.  Sodium 126.  TSH normal.  Urine sodium less than 39 and urine 

osmolality 176.


2.  Increased thirst with glucose of 155.  Rule out diabetes.


3.  Benign hypertension.  Controlled.


4.  Chronic kidney disease stage IIIA with baseline creatinine 1-1.1 secondary 

to nephrosclerosis.





Plan: 





Continue with fluid restriction


Repeat labs in a.m.


Maintain good oral intake particularly protein

## 2023-08-12 NOTE — PN
PROGRESS NOTE



DATE OF SERVICE:  08/12/2023



SUBJECTIVE:

This is a 77-year-old woman, who was admitted with hyponatremia, possibly hypovolemic,

is being closely monitored.  The patient is also evaluated for gait dysfunction.  No

chest pain. No palpitation.



OBJECTIVE:

VITAL SIGNS: Pulse 77, blood pressure 122/70, respirations 16.

CHEST:  Clear to auscultation.

CARDIOVASCULAR: S1, S2.

ABDOMEN:  Soft.

NERVOUS SYSTEM: Nonfocal.



LABORATORY DATA:

Reviewed.  Sodium is 133.



ASSESSMENT:

1. Hyponatremia, possibly hypovolemic.

2. Mild acute renal failure present on admission secondary to dehydration and acute

    tubular necrosis.

3. Hypertension.

4. Hyperlipidemia.

5. Multiple medical issues.

6. Gait dysfunction, multifactorial.



RECOMMENDATIONS:

Recommend to continue current management. Continue symptomatic treatment. Repeat labs.

Otherwise closely follow with Nephrology.  Guarded prognosis. Further recommendations

to follow. PT, OT evaluation, possible ECF rehab.





KARRI / ALEXANDRO: [id number] / Job#: [id number]

## 2023-08-13 LAB
ANION GAP SERPL CALC-SCNC: 13.3 MMOL/L (ref 4–12)
BASOPHILS # BLD AUTO: 0.05 X 10*3/UL (ref 0–0.1)
BASOPHILS NFR BLD AUTO: 0.6 %
BUN SERPL-SCNC: 13.7 MG/DL (ref 9–27)
BUN/CREAT SERPL: 13.7 RATIO (ref 12–20)
CALCIUM SPEC-MCNC: 9.2 MG/DL (ref 8.7–10.3)
CHLORIDE SERPL-SCNC: 98 MMOL/L (ref 96–109)
CO2 SERPL-SCNC: 23.7 MMOL/L (ref 21.6–31.8)
EOSINOPHIL # BLD AUTO: 0.08 X 10*3/UL (ref 0.04–0.35)
EOSINOPHIL NFR BLD AUTO: 0.9 %
ERYTHROCYTE [DISTWIDTH] IN BLOOD BY AUTOMATED COUNT: 4.61 X 10*6/UL (ref 4.1–5.2)
ERYTHROCYTE [DISTWIDTH] IN BLOOD: 13.1 % (ref 11.5–14.5)
GLUCOSE SERPL-MCNC: 141 MG/DL (ref 70–110)
HCT VFR BLD AUTO: 41.3 % (ref 37.2–46.3)
HGB BLD-MCNC: 13.9 D/DL (ref 12–15)
IMM GRANULOCYTES BLD QL AUTO: 0.2 %
LYMPHOCYTES # SPEC AUTO: 2.95 X 10*3/UL (ref 0.9–5)
LYMPHOCYTES NFR SPEC AUTO: 34.7 %
MCH RBC QN AUTO: 30.2 PG (ref 27–32)
MCHC RBC AUTO-ENTMCNC: 33.7 D/DL (ref 32–37)
MCV RBC AUTO: 89.6 FL (ref 80–97)
MONOCYTES # BLD AUTO: 0.75 X 10*3/UL (ref 0.2–1)
MONOCYTES NFR BLD AUTO: 8.8 %
NEUTROPHILS # BLD AUTO: 4.65 X 10*3/UL (ref 1.8–7.7)
NEUTROPHILS NFR BLD AUTO: 54.8 %
NRBC BLD AUTO-RTO: 0 X 10*3/UL (ref 0–0.01)
PLATELET # BLD AUTO: 200 X 10*3/UL (ref 140–440)
POTASSIUM SERPL-SCNC: 4.5 MMOL/L (ref 3.5–5.5)
SODIUM SERPL-SCNC: 135 MMOL/L (ref 135–145)
WBC # BLD AUTO: 8.5 X 10*3/UL (ref 4.5–10)

## 2023-08-13 RX ADMIN — METOPROLOL TARTRATE SCH MG: 25 TABLET, FILM COATED ORAL at 09:06

## 2023-08-13 RX ADMIN — TEMAZEPAM PRN MG: 15 CAPSULE ORAL at 23:18

## 2023-08-13 RX ADMIN — ATORVASTATIN CALCIUM SCH MG: 10 TABLET, FILM COATED ORAL at 20:20

## 2023-08-13 RX ADMIN — METOPROLOL TARTRATE SCH MG: 25 TABLET, FILM COATED ORAL at 20:20

## 2023-08-13 RX ADMIN — Medication SCH EACH: at 09:06

## 2023-08-13 NOTE — P.PN
Subjective





Patient is seen for follow-up for hyponatremia.


No significant complaints today.


Patient reports good oral intake.


Serum sodium at 135 today





Not on any IV fluids or diuretics.








Objective





- Vital Signs


Vital signs: 


                                   Vital Signs











Temp  97.5 F L  08/13/23 07:54


 


Pulse  67   08/13/23 08:00


 


Resp  14   08/13/23 08:00


 


BP  134/67   08/13/23 07:54


 


Pulse Ox  94 L  08/13/23 07:54


 


FiO2      








                                 Intake & Output











 08/12/23 08/13/23 08/13/23





 18:59 06:59 18:59


 


Intake Total 118  118


 


Balance 118  118


 


Intake:   


 


  Oral 118  118


 


Other:   


 


  Voiding Method Toilet Toilet Toilet


 


  # Voids 3  














- Exam





Patient is awake, comfortable, no acute distress


Alert oriented 3


Examination of the heart S1 and S2


Examination of the lungs bilateral breath sounds are heard


Abdomen is soft nontender


Examination of lower extremities shows no significant edema


CNS exam grossly intact





- Labs


CBC & Chem 7: 


                                 08/13/23 07:34





                                 08/13/23 07:34


Labs: 


                  Abnormal Lab Results - Last 24 Hours (Table)











  08/13/23 Range/Units





  07:34 


 


Anion Gap  13.30 H  (4.00-12.00)  mmol/L


 


Est GFR (CKD-EPI)  58 L  (>=60)   


 


Glucose  141 H  ()  mg/dL














Assessment and Plan


Assessment: 





1.  Hyponatremia from excessive fluid intake and component of hypovolemia from 

vomiting.  Sodium 135 today.  TSH normal.  Urine sodium less than 39 and urine 

osmolality 176.


2.  Increased thirst most likely related to diabetes and hyperglycemia


3.  Benign hypertension.  Controlled.


4.  Chronic kidney disease stage IIIA with baseline creatinine 1-1.1 secondary 

to nephrosclerosis.





Plan: 





Continue with fluid restriction


Repeat labs in a.m.


Maintain good oral intake particularly protein

## 2023-08-14 LAB
ANION GAP SERPL CALC-SCNC: 11.4 MMOL/L (ref 4–12)
BASOPHILS # BLD AUTO: 0.04 X 10*3/UL (ref 0–0.1)
BASOPHILS NFR BLD AUTO: 0.5 %
BUN SERPL-SCNC: 15.7 MG/DL (ref 9–27)
BUN/CREAT SERPL: 14.27 RATIO (ref 12–20)
CALCIUM SPEC-MCNC: 9.2 MG/DL (ref 8.7–10.3)
CHLORIDE SERPL-SCNC: 97 MMOL/L (ref 96–109)
CO2 SERPL-SCNC: 24.6 MMOL/L (ref 21.6–31.8)
EOSINOPHIL # BLD AUTO: 0.08 X 10*3/UL (ref 0.04–0.35)
EOSINOPHIL NFR BLD AUTO: 0.9 %
ERYTHROCYTE [DISTWIDTH] IN BLOOD BY AUTOMATED COUNT: 4.55 X 10*6/UL (ref 4.1–5.2)
ERYTHROCYTE [DISTWIDTH] IN BLOOD: 13.1 % (ref 11.5–14.5)
GLUCOSE SERPL-MCNC: 146 MG/DL (ref 70–110)
HCT VFR BLD AUTO: 41.6 % (ref 37.2–46.3)
HGB BLD-MCNC: 13.6 D/DL (ref 12–15)
IMM GRANULOCYTES BLD QL AUTO: 0.3 %
LYMPHOCYTES # SPEC AUTO: 2.62 X 10*3/UL (ref 0.9–5)
LYMPHOCYTES NFR SPEC AUTO: 30.1 %
MCH RBC QN AUTO: 29.9 PG (ref 27–32)
MCHC RBC AUTO-ENTMCNC: 32.7 D/DL (ref 32–37)
MCV RBC AUTO: 91.4 FL (ref 80–97)
MONOCYTES # BLD AUTO: 0.83 X 10*3/UL (ref 0.2–1)
MONOCYTES NFR BLD AUTO: 9.5 %
NEUTROPHILS # BLD AUTO: 5.11 X 10*3/UL (ref 1.8–7.7)
NEUTROPHILS NFR BLD AUTO: 58.7 %
NRBC BLD AUTO-RTO: 0 X 10*3/UL (ref 0–0.01)
PLATELET # BLD AUTO: 200 X 10*3/UL (ref 140–440)
POTASSIUM SERPL-SCNC: 4.4 MMOL/L (ref 3.5–5.5)
SODIUM SERPL-SCNC: 133 MMOL/L (ref 135–145)
WBC # BLD AUTO: 8.71 X 10*3/UL (ref 4.5–10)

## 2023-08-14 RX ADMIN — METOPROLOL TARTRATE SCH MG: 25 TABLET, FILM COATED ORAL at 20:43

## 2023-08-14 RX ADMIN — ATORVASTATIN CALCIUM SCH MG: 10 TABLET, FILM COATED ORAL at 20:43

## 2023-08-14 RX ADMIN — Medication SCH EACH: at 09:56

## 2023-08-14 RX ADMIN — METOPROLOL TARTRATE SCH MG: 25 TABLET, FILM COATED ORAL at 09:56

## 2023-08-14 NOTE — US
EXAMINATION TYPE: US extremity nonvasc mass RT

 

DATE OF EXAM: 8/14/2023

 

COMPARISON: NONE

 

CLINICAL INDICATION: Female, 77 years old with history of r/o right antecubital fossa abscess pl; Red
ness, edema, palpable right antecubital fossa. Recent IV site 

 

TECHNIQUE:  Grayscale imaging of the right antecubital fossa.

 

FINDINGS:  scanned within patient's area of concern, right antecubital fossa, superficial thrombus no
willard within cephalic vein 

 

 

 

 

 

IMPRESSION: Superficial thrombophlebitis within the right cephalic vein.

## 2023-08-14 NOTE — PN
PROGRESS NOTE



DATE OF SERVICE:  08/13/2023



SUBJECTIVE:

This is a 77-year-old woman, who was admitted with possibly hypovolemic, hyponatremia

also had some cellulitis of the right antecubital fossa.



PHYSICAL EXAMINATION:

VITAL SIGNS:  Pulse 67, blood pressure 134/69, respirations 17.

HEENT:  Conjunctivae normal.

CARDIOVASCULAR:  S1, S2.

RESPIRATIONS: Breath sounds diminished at the bases.

ABDOMEN:  Soft.

NERVOUS SYSTEM:  Nonfocal.



LABORATORY DATA:

Noted.



ASSESSMENT:

1. Hyponatremia, possibly hypovolemic.

2. Mild acute renal failure on admission secondary to dehydration.

3. Acute tubular necrosis.

4. Hypertension.

5. Hyperlipidemia.

6. Multiple medical issues.

7. Gait dysfunction, multifactorial.

8. Right forearms.

9. Antecubital fossa cellulitis.



RECOMMENDATION:

Continue current management, and I would recommend a short course of antibiotics for

the cellulitis.  Otherwise, warm compresses.  Repeat labs.  PT/OT evaluation, possible

serial labs.



MMODL / IJN: [id number] / Job#: [id number]

## 2023-08-14 NOTE — P.PN
Subjective





Patient is seen for follow-up for hyponatremia.


No significant complaints today.


Patient reports good oral intake.


Serum sodium at 133 today





Not on any IV fluids or diuretics.








Objective





- Vital Signs


Vital signs: 


                                   Vital Signs











Temp  98 F   08/14/23 07:30


 


Pulse  73   08/14/23 07:30


 


Resp  18   08/14/23 07:30


 


BP  132/73   08/14/23 07:30


 


Pulse Ox  95   08/14/23 07:30


 


FiO2      








                                 Intake & Output











 08/13/23 08/14/23 08/14/23





 18:59 06:59 18:59


 


Intake Total 118  120


 


Output Total   1


 


Balance 118  119


 


Intake:   


 


  Oral 118  120


 


Output:   


 


  Urine   1


 


Other:   


 


  Voiding Method Toilet Toilet 


 


  # Voids  2 














- Exam





Patient is awake, comfortable, no acute distress


Alert oriented 3


Examination of the heart S1 and S2


Examination of the lungs bilateral breath sounds are heard


Abdomen is soft nontender


Examination of lower extremities shows no significant edema


CNS exam grossly intact





- Labs


CBC & Chem 7: 


                                 08/14/23 05:58





                                 08/14/23 05:58


Labs: 


                  Abnormal Lab Results - Last 24 Hours (Table)











  08/14/23 Range/Units





  05:58 


 


Sodium  133 L  (135-145)  mmol/L


 


Est GFR (CKD-EPI)  52 L  (>=60)   


 


Glucose  146 H  ()  mg/dL














Assessment and Plan


Assessment: 





1.  Hyponatremia from excessive fluid intake and component of hypovolemia from 

vomiting.  Sodium 133 today.  TSH normal.  Urine sodium less than 39 and urine 

osmolality 176.


2.  Increased thirst most likely related to diabetes and hyperglycemia


3.  Benign hypertension.  Controlled.


4.  Chronic kidney disease stage IIIA with baseline creatinine 1-1.1 secondary 

to nephrosclerosis.





Plan: 





Continue with fluid restriction





Maintain good oral intake particularly protein

## 2023-08-14 NOTE — P.PN
Subjective





This is a pleasant 77 years old female who presents with hyponatremia which is 

resolved now with new flushed team following closely and recommended with fluid 

restriction


Patient herself was doing well sitting in chair pleasant relaxant smiling, she 

denies that well denies abdominal pain and she has good bowel movement.  She 

states that her diarrhea was stopped


She denies any other new complaints.


Vitals and labs are stable.


Sodium level improved.


Patient also develops right antecubital fossa superficial thrombosis and 

phlebitis in ulcers of the right upper extremity with possible some evidence of 

infection as there is folliculitis therefore patient was started on ceftriaxone 

which is switched to cefazolin


We will continue monitor for 24 hours and possible discharge in 24-48 hours if 

keeps improving


A shingle go to subacute rehab And she got accepted with  and case





Objective





- Vital Signs


Vital signs: 


                                   Vital Signs











Temp  97.9 F   08/14/23 20:39


 


Pulse  85   08/14/23 20:39


 


Resp  16   08/14/23 20:39


 


BP  135/80   08/14/23 20:39


 


Pulse Ox  97   08/14/23 20:39


 


FiO2      








                                 Intake & Output











 08/14/23 08/14/23 08/15/23





 06:59 18:59 06:59


 


Intake Total  360 500


 


Output Total  1 


 


Balance  359 500


 


Intake:   


 


  Oral  360 500


 


Output:   


 


  Urine  1 


 


Other:   


 


  Voiding Method Toilet Toilet Toilet


 


  # Voids 2 1 














- Exam





GENERAL: The patient is alert and oriented x3, not in any acute distress. Well 

developed, well nourished. 


HEENT: Pupils are round and equally reacting to light. EOMI. No scleral icterus.

No conjunctival pallor. Normocephalic, atraumatic. No pharyngeal erythema. No t

hyromegaly. 


CARDIOVASCULAR: S1 and S2 present. No murmurs, rubs, or gallops. 


PULMONARY: Chest is clear to auscultation, no wheezing , no crackles. 


ABDOMEN: Soft, nontender, nondistended, normoactive bowel sounds. No palpable 

organomegaly. 


MUSCULOSKELETAL: No joint swelling or deformity. 


-EXTREMITIES: No cyanosis, clubbing, or pedal edema.  Mild right antecubital 

fossa superficial thrombophlebitis with a small folliculitis


NEUROLOGICAL: Gross neurological examination did not reveal any focal deficits. 


SKIN: No rashes. no petechiae.





- Labs


CBC & Chem 7: 


                                 08/14/23 05:58





                                 08/14/23 05:58


Labs: 


                  Abnormal Lab Results - Last 24 Hours (Table)











  08/14/23 Range/Units





  05:58 


 


Sodium  133 L  (135-145)  mmol/L


 


Est GFR (CKD-EPI)  52 L  (>=60)   


 


Glucose  146 H  ()  mg/dL














Assessment and Plan


Assessment: 





Small bowel obstruction, resolved


right antecubital fossa superficial thrombophlebitis with a small folliculitis


Abdominal no nausea vomiting and diarrhea most likely acute gastroenteritis, 

resolved


Diabetes mellitus type 2


History of gastroesophageal reflux disease


Hyperlipidemia


Plan: 





Continue gentle hydration


Change antibiotic to cefazolin


Ultrasound of the upper extremity show no definitive deep but superficial 

thrombophlebitis


Patient will go to subacute rehab upon discharge


Labs and medication were reviewed..  Continue same treatment.  Continue with 

symptomatic treatment.  Resume home medication.  Monitor labs and vitals.  DVT 

and GI prophylaxis.  Further recommendations as per clinical course of the 

patient


DVT prophylaxis: Subcutaneous heparin


GI Prophylaxis: Pepcid


PT/OT: Pending


Prognosis is guarded


Possible discharge in 24 to 48-hour

## 2023-08-14 NOTE — US
EXAMINATION TYPE: US venous doppler duplex UE RT

 

DATE OF EXAM: 8/14/2023

 

COMPARISON: NONE

 

CLINICAL INDICATION: Female, 77 years old with history of Right antecubital area reddened and firm; r
edness, palpable right antecubital fossa. history of recent IV 

 

SIDE PERFORMED: right 

 

 

 

Right Arm: No evidence of DVT. Intraluminal clot seen within the Superficial vascular structure jacqueline
tible with thrombus noted within cephalic vein at antecubital fossa 

 

 

 

IMPRESSION: 

No evidence of DVT. Superficial thrombus noted within cephalic vein at antecubital fossa

## 2023-08-15 VITALS
DIASTOLIC BLOOD PRESSURE: 74 MMHG | RESPIRATION RATE: 18 BRPM | SYSTOLIC BLOOD PRESSURE: 156 MMHG | TEMPERATURE: 97.8 F | HEART RATE: 77 BPM

## 2023-08-15 RX ADMIN — Medication SCH EACH: at 09:06

## 2023-08-15 RX ADMIN — METOPROLOL TARTRATE SCH MG: 25 TABLET, FILM COATED ORAL at 09:06

## 2023-08-15 NOTE — P.PN
Subjective





Patient is seen for follow-up for hyponatremia.


No significant complaints today.


Patient reports good oral intake.


Serum sodium at 133





Not on any IV fluids or diuretics.








Objective





- Vital Signs


Vital signs: 


                                   Vital Signs











Temp  97.8 F   08/15/23 07:17


 


Pulse  77   08/15/23 07:17


 


Resp  18   08/15/23 07:17


 


BP  156/74   08/15/23 07:17


 


Pulse Ox  93 L  08/15/23 07:17


 


FiO2      








                                 Intake & Output











 08/14/23 08/15/23 08/15/23





 18:59 06:59 18:59


 


Intake Total 360 500 


 


Output Total 1  


 


Balance 359 500 


 


Intake:   


 


  Oral 360 500 


 


Output:   


 


  Urine 1  


 


Other:   


 


  Voiding Method Toilet Toilet 


 


  # Voids 1 3 














- Exam





Patient is awake, comfortable, no acute distress


Alert oriented 3





Examination of lower extremities shows no significant edema


CNS exam grossly intact





- Labs


CBC & Chem 7: 


                                 08/14/23 05:58





                                 08/14/23 05:58





Assessment and Plan


Assessment: 





1.  Hyponatremia from excessive fluid intake and component of hypovolemia from 

vomiting.  Sodium 133 .  TSH normal.  Urine sodium less than 39 and urine 

osmolality 176.


2.  Increased thirst most likely related to diabetes and hyperglycemia


3.  Benign hypertension.  Controlled.


4.  Chronic kidney disease stage IIIA with baseline creatinine 1-1.1 secondary 

to nephrosclerosis.





Plan: 





Continue with fluid restriction





Maintain good oral intake particularly protein


Follow-up as outpatient with repeat labs in 3-4 days.

## 2023-08-15 NOTE — P.DS
Providers


Date of admission: 


08/10/23 13:35





Attending physician: 


Will Brewster MD





Consults: 





                                        





08/10/23 09:32


Consult Physician Routine 


   Consulting Provider: Bo Middleton


   Consult Reason/Comments: hyponatremia


   Do you want consulting provider notified?: Yes











Primary care physician: 


Sandra Foy





Hospital Course: 





Diagnoses:


Small bowel obstruction, resolved


right antecubital fossa superficial thrombophlebitis with a small folliculitis. 

Significantly improvement


Abdominal no nausea vomiting and diarrhea most likely acute gastroenteritis, 

resolved


Diabetes mellitus type 2


History of gastroesophageal reflux disease


Hyperlipidemia











Hospital course:


This is a pleasant 77 years old female who presents with hyponatremia which is 

resolved now with new flushed team following closely and recommended with fluid 

restriction


Patient herself was doing well sitting in chair pleasant relaxant and smiling, 

she denies that well denies abdominal pain and she has good bowel movement.  She

states that her diarrhea was stopped


She tolerated diets well.  She has regular bowel movement and abdominal pain and

tenderness completely resolved.  Patient denies chest pain or dyspnea upon 

discharge.


She has evidence of right antecubital fossa superficial thrombophlebitis with no

DVT seen on ultrasound with some evidence of cellulitis and folliculitis 

responded to treatment with cefazolin with significant improvement with less 

swelling and redness and tenderness.


Patient clinically doing well and she can be discharged on short course of oral 

Keflex


Patient was cleared for discharge by nephrologist.  Her hyponatremia improved 

126 up to 133.


Problems and management plan were discussed with the patient and he verbalized 

understanding and acceptance


Patient was found stable and can be discharged home in guarded prognosis however

he needs follow-up as an outpatient. Patient was instructed to follow up with 

PCP Dr. Mcadams within one week and patient agrees








Physical exam


Gen: patient is a AAOx3, no distress


CVS: S1-S2, RRR, no murmur


Lungs: B/L CTA, no wheezing


Abdomen: soft, no distention, no tenderness, positive bowel sounds


Extremity: no leg edema or induration





Time spent more than 35 minutes





Patient Condition at Discharge: Fair





Plan - Discharge Summary


Discharge Rx Participant: No


New Discharge Prescriptions: 


New


   Atorvastatin [Lipitor] 10 mg PO HS  tab





Continue


   Metoprolol Tartrate 25 mg PO BID


   lisinopriL [Zestril] 5 mg PO DAILY


   Calcium Carbonate [Calcium] 600 mg PO DAILY





Discontinued


   amLODIPine/ATORVASTATIN [Caduet 10 mg-10 mg Tablet] 1 tab PO HS


Discharge Medication List





Metoprolol Tartrate 25 mg PO BID 03/10/20 [History]


lisinopriL [Zestril] 5 mg PO DAILY 03/10/20 [History]


Calcium Carbonate [Calcium] 600 mg PO DAILY 08/10/23 [History]


Atorvastatin [Lipitor] 10 mg PO HS  tab 08/14/23 [Rx]








Follow up Appointment(s)/Referral(s): 


Law Flores MD [Primary Care Provider] - 1-2 days

## 2023-08-18 NOTE — CDI
Documentation Clarification Form



Date: 08/18/2023 06:58:43 AM

From: Melissa Bautista RN, CCDS

Email: [email or web address] 

MRN: [id number]

Admit Date: 08/10/2023 01:35:00 PM

Patient Name: Schoenberg, Carol A

Visit Number: [id number]

Discharge Date:  08/15/2023 01:40:00 PM





ATTENTION: The Clinical Documentation Specialists (CDI) and [facility] Coding Staff 
appreciate your assistance in clarifying documentation. Please respond to the 
clarification below the line at the bottom and electronically sign. The CDI & 
[facility] Coding staff will review the response and follow-up if needed. Please note: 
Queries are made part of the Legal Health Record. If you have any questions, 
please contact the author of this message via ITS.



Dr. Nilesh Mcintosh



Mild acute renal failure on admission secondary to dehydration and acute tubular
necrosis is documented in your progress notes which may lack sufficient clinical
evidence/support in the medical record. Additional clarification is requested.



History/Risk Factors: HTN, HLD, CKD stage III with baseline Cr of 1-1.1 
secondary to nephrosclerosis. Admitted with hyponatremia and dehydration.



Clinical Indicators:

8/10-8/14 Cr 1.07 - 1.1 - 0.92 - 1.0 - 1.1

8/10-8/14 Na [phone]

8/11 IM: "Mild acute renal failure present on admission secondary to dehydration
and acute tubular necrosis.

8/10 Nephrology consult: "Hyponatremia from excessive fluid intake and component
of hypovolemia from vomiting.  Sodium 126. Chronic kidney disease stage IIIA 
with baseline creatinine 1-1.1 secondary to nephrosclerosis."

8/13 IM: "Mild acute renal failure on admission secondary to dehydration. Acute 
tubular necrosis."



Treatment: 1500ml fluid restriction, IV 0.9 NS 1L bolus on 8/10 followed by 
75ml/hr until 8/11 then heplock IV.



Please clarify if acute renal failure with acute tubular necrosis is a valid 
diagnosis?



[ x ] Yes, acute renal failure with acute tubular necrosis was present as 
evidenced by (additional clinical support): ____________



[  ] No, acute renal failure with acute tubular necrosis was ruled out



[  ] Other (please specify diagnosis) ______________________



[  ] Unable to determine

MTDD